# Patient Record
Sex: FEMALE | Race: WHITE | Employment: FULL TIME | URBAN - NONMETROPOLITAN AREA
[De-identification: names, ages, dates, MRNs, and addresses within clinical notes are randomized per-mention and may not be internally consistent; named-entity substitution may affect disease eponyms.]

---

## 2023-02-12 ENCOUNTER — APPOINTMENT (OUTPATIENT)
Dept: CT IMAGING | Age: 58
End: 2023-02-12
Payer: COMMERCIAL

## 2023-02-12 ENCOUNTER — HOSPITAL ENCOUNTER (EMERGENCY)
Age: 58
Discharge: HOME | End: 2023-02-12
Attending: EMERGENCY MEDICINE
Payer: COMMERCIAL

## 2023-02-12 ENCOUNTER — APPOINTMENT (OUTPATIENT)
Dept: GENERAL RADIOLOGY | Age: 58
End: 2023-02-12
Payer: COMMERCIAL

## 2023-02-12 VITALS
TEMPERATURE: 98.3 F | HEART RATE: 75 BPM | DIASTOLIC BLOOD PRESSURE: 74 MMHG | RESPIRATION RATE: 25 BRPM | SYSTOLIC BLOOD PRESSURE: 124 MMHG | OXYGEN SATURATION: 94 %

## 2023-02-12 DIAGNOSIS — R91.8 PULMONARY NODULES: ICD-10-CM

## 2023-02-12 DIAGNOSIS — J44.9 CHRONIC OBSTRUCTIVE PULMONARY DISEASE, UNSPECIFIED COPD TYPE (HCC): ICD-10-CM

## 2023-02-12 DIAGNOSIS — J32.1 CHRONIC FRONTAL SINUSITIS: ICD-10-CM

## 2023-02-12 DIAGNOSIS — F41.1 ANXIETY STATE: Primary | ICD-10-CM

## 2023-02-12 LAB
ALBUMIN SERPL BCG-MCNC: 4.3 G/DL (ref 3.5–5.1)
ALP SERPL-CCNC: 118 U/L (ref 38–126)
ALT SERPL W/O P-5'-P-CCNC: 9 U/L (ref 11–66)
ANION GAP SERPL CALC-SCNC: 8 MEQ/L (ref 8–16)
AST SERPL-CCNC: 14 U/L (ref 5–40)
BASOPHILS ABSOLUTE: 0 THOU/MM3 (ref 0–0.1)
BASOPHILS NFR BLD AUTO: 0.5 %
BILIRUB CONJ SERPL-MCNC: < 0.2 MG/DL (ref 0–0.3)
BILIRUB SERPL-MCNC: 0.7 MG/DL (ref 0.3–1.2)
BUN SERPL-MCNC: 11 MG/DL (ref 7–22)
CALCIUM SERPL-MCNC: 10.8 MG/DL (ref 8.5–10.5)
CHLORIDE SERPL-SCNC: 106 MEQ/L (ref 98–111)
CO2 SERPL-SCNC: 27 MEQ/L (ref 23–33)
CREAT SERPL-MCNC: 0.5 MG/DL (ref 0.4–1.2)
DEPRECATED RDW RBC AUTO: 42.5 FL (ref 35–45)
EOSINOPHIL NFR BLD AUTO: 1.2 %
EOSINOPHILS ABSOLUTE: 0.1 THOU/MM3 (ref 0–0.4)
ERYTHROCYTE [DISTWIDTH] IN BLOOD BY AUTOMATED COUNT: 13.1 % (ref 11.5–14.5)
FLUAV RNA RESP QL NAA+PROBE: NOT DETECTED
FLUBV RNA RESP QL NAA+PROBE: NOT DETECTED
GFR SERPL CREATININE-BSD FRML MDRD: > 60 ML/MIN/1.73M2
GLUCOSE SERPL-MCNC: 125 MG/DL (ref 70–108)
HCT VFR BLD AUTO: 43.9 % (ref 37–47)
HGB BLD-MCNC: 14.7 GM/DL (ref 12–16)
IMM GRANULOCYTES # BLD AUTO: 0.02 THOU/MM3 (ref 0–0.07)
IMM GRANULOCYTES NFR BLD AUTO: 0.2 %
LIPASE SERPL-CCNC: 26.8 U/L (ref 5.6–51.3)
LYMPHOCYTES ABSOLUTE: 1.8 THOU/MM3 (ref 1–4.8)
LYMPHOCYTES NFR BLD AUTO: 20.5 %
MAGNESIUM SERPL-MCNC: 2.1 MG/DL (ref 1.6–2.4)
MCH RBC QN AUTO: 29.7 PG (ref 26–33)
MCHC RBC AUTO-ENTMCNC: 33.5 GM/DL (ref 32.2–35.5)
MCV RBC AUTO: 88.7 FL (ref 81–99)
MONOCYTES ABSOLUTE: 0.7 THOU/MM3 (ref 0.4–1.3)
MONOCYTES NFR BLD AUTO: 8.1 %
NEUTROPHILS NFR BLD AUTO: 69.5 %
NRBC BLD AUTO-RTO: 0 /100 WBC
NT-PROBNP SERPL IA-MCNC: 126.9 PG/ML (ref 0–124)
OSMOLALITY SERPL CALC.SUM OF ELEC: 282.1 MOSMOL/KG (ref 275–300)
PLATELET # BLD AUTO: 315 THOU/MM3 (ref 130–400)
PMV BLD AUTO: 10.1 FL (ref 9.4–12.4)
POTASSIUM SERPL-SCNC: 4.3 MEQ/L (ref 3.5–5.2)
PROT SERPL-MCNC: 6.5 G/DL (ref 6.1–8)
RBC # BLD AUTO: 4.95 MILL/MM3 (ref 4.2–5.4)
SARS-COV-2 RNA RESP QL NAA+PROBE: NOT DETECTED
SEGMENTED NEUTROPHILS ABSOLUTE COUNT: 6 THOU/MM3 (ref 1.8–7.7)
SODIUM SERPL-SCNC: 141 MEQ/L (ref 135–145)
TROPONIN T: < 0.01 NG/ML
TROPONIN T: < 0.01 NG/ML
WBC # BLD AUTO: 8.6 THOU/MM3 (ref 4.8–10.8)

## 2023-02-12 PROCEDURE — 87636 SARSCOV2 & INF A&B AMP PRB: CPT

## 2023-02-12 PROCEDURE — 83880 ASSAY OF NATRIURETIC PEPTIDE: CPT

## 2023-02-12 PROCEDURE — 6360000002 HC RX W HCPCS: Performed by: EMERGENCY MEDICINE

## 2023-02-12 PROCEDURE — 96374 THER/PROPH/DIAG INJ IV PUSH: CPT

## 2023-02-12 PROCEDURE — 83690 ASSAY OF LIPASE: CPT

## 2023-02-12 PROCEDURE — 84484 ASSAY OF TROPONIN QUANT: CPT

## 2023-02-12 PROCEDURE — 85025 COMPLETE CBC W/AUTO DIFF WBC: CPT

## 2023-02-12 PROCEDURE — 36415 COLL VENOUS BLD VENIPUNCTURE: CPT

## 2023-02-12 PROCEDURE — 71045 X-RAY EXAM CHEST 1 VIEW: CPT

## 2023-02-12 PROCEDURE — 6360000004 HC RX CONTRAST MEDICATION: Performed by: EMERGENCY MEDICINE

## 2023-02-12 PROCEDURE — 71275 CT ANGIOGRAPHY CHEST: CPT

## 2023-02-12 PROCEDURE — 93005 ELECTROCARDIOGRAM TRACING: CPT | Performed by: EMERGENCY MEDICINE

## 2023-02-12 PROCEDURE — 80053 COMPREHEN METABOLIC PANEL: CPT

## 2023-02-12 PROCEDURE — 99285 EMERGENCY DEPT VISIT HI MDM: CPT | Performed by: EMERGENCY MEDICINE

## 2023-02-12 PROCEDURE — 83735 ASSAY OF MAGNESIUM: CPT

## 2023-02-12 PROCEDURE — 82248 BILIRUBIN DIRECT: CPT

## 2023-02-12 PROCEDURE — 94640 AIRWAY INHALATION TREATMENT: CPT

## 2023-02-12 RX ORDER — ALBUTEROL SULFATE 90 UG/1
2 AEROSOL, METERED RESPIRATORY (INHALATION) 4 TIMES DAILY PRN
Qty: 18 G | Refills: 0 | Status: SHIPPED | OUTPATIENT
Start: 2023-02-12

## 2023-02-12 RX ORDER — ALPRAZOLAM 0.25 MG/1
0.25 TABLET ORAL NIGHTLY PRN
Qty: 7 TABLET | Refills: 0 | Status: SHIPPED | OUTPATIENT
Start: 2023-02-12 | End: 2023-02-19

## 2023-02-12 RX ORDER — FLUTICASONE PROPIONATE 50 MCG
2 SPRAY, SUSPENSION (ML) NASAL DAILY
Qty: 16 G | Refills: 0 | Status: SHIPPED | OUTPATIENT
Start: 2023-02-12

## 2023-02-12 RX ORDER — LORAZEPAM 2 MG/ML
1 INJECTION INTRAMUSCULAR ONCE
Status: COMPLETED | OUTPATIENT
Start: 2023-02-12 | End: 2023-02-12

## 2023-02-12 RX ORDER — ALBUTEROL SULFATE 2.5 MG/3ML
5 SOLUTION RESPIRATORY (INHALATION) ONCE
Status: COMPLETED | OUTPATIENT
Start: 2023-02-12 | End: 2023-02-12

## 2023-02-12 RX ORDER — CETIRIZINE HYDROCHLORIDE 10 MG/1
10 TABLET ORAL DAILY
Qty: 30 TABLET | Refills: 0 | Status: SHIPPED | OUTPATIENT
Start: 2023-02-12 | End: 2023-03-14

## 2023-02-12 RX ADMIN — IPRATROPIUM BROMIDE 0.5 MG: 0.5 SOLUTION RESPIRATORY (INHALATION) at 13:00

## 2023-02-12 RX ADMIN — ALBUTEROL SULFATE 5 MG: 2.5 SOLUTION RESPIRATORY (INHALATION) at 13:00

## 2023-02-12 RX ADMIN — IOPAMIDOL 80 ML: 755 INJECTION, SOLUTION INTRAVENOUS at 14:51

## 2023-02-12 RX ADMIN — LORAZEPAM 1 MG: 2 INJECTION INTRAMUSCULAR; INTRAVENOUS at 13:10

## 2023-02-12 NOTE — ED PROVIDER NOTES
[facility] [address] [address] EMERGENCY DEPT      EMERGENCY MEDICINE     Pt Name: Delmis Yu  MRN: [id number]  [place] 1965  Date of evaluation: 2/12/2023  Resident Physician: Eldon Anderson DPM  Supervising Physician: Ramya Yuan DO    CHIEF COMPLAINT       Chief Complaint   Patient presents with    Insomnia     HISTORY OF PRESENT ILLNESS   Delmis Yu is a pleasant 59 y.o. female who presents to the emergency department from from home, as a walk in to the ED lobby for evaluation of insomnia and shortness of breath. Patient states that for the past week she has attempted to lay down to sleep and her mind will not stop racing and she feels short of breath, describing a sensation of tightness and squeezing around her neck. She relates that her sleeping habits are not the best, and she has to continually rock her head back-and-forth while attempting to sleep and has done this since childhood. She relates that she did visit her mother recently and contracted COVID approximately 3 weeks ago and was quite upset and anxious as the facility her mother was then went under lockdown protocol. She does not relate any feelings of anxiety when trying to sleep since before this time. She does admit to a cough that has persisted for the past year or so, with intermittently bringing up phlegm. She relates that she only feels short of breath during the times when trying to sleep. She relates that she has not seen a physician in approximately 30 years. Traits that she has smoked since she was 13, most recently 1 pack/day. On exam patient was tearful and anxious, however not in any acute distress. During exam she did not endorse any shortness of breath, chest pain or cough. PASTMEDICAL HISTORY   No past medical history on file. There is no problem list on file for this patient. SURGICAL HISTORY     No past surgical history on file.     CURRENT MEDICATIONS       Discharge Medication List as of 2/12/2023  3:27 PM          ALLERGIES     has no allergies on file. FAMILY HISTORY     She indicated that her mother is alive. She indicated that her father is . She indicated that both of her sisters are alive. She indicated that her brother is alive. SOCIAL HISTORY       Social History     Tobacco Use    Smoking status: Every Day     Packs/day: 1.00     Years: 40.00     Pack years: 40.00     Types: Cigarettes   Substance Use Topics    Drug use: Never       PHYSICAL EXAM       ED Triage Vitals   BP Temp Temp src Pulse Resp SpO2 Height Weight   -- -- -- -- -- -- -- --       Additional Vital Signs:  Vitals:    23 1436   BP: 124/74   Pulse: 75   Resp: 25   Temp:    SpO2: 94%     Physical Exam  Vitals and nursing note reviewed. Constitutional:       General: She is not in acute distress. Appearance: She is not ill-appearing. HENT:      Head: Normocephalic and atraumatic. Right Ear: Tympanic membrane normal.      Left Ear: Tympanic membrane normal.      Nose: Congestion and rhinorrhea present. Mouth/Throat:      Mouth: Mucous membranes are moist.      Pharynx: Oropharynx is clear. Eyes:      General: Lids are normal. Gaze aligned appropriately. Extraocular Movements: Extraocular movements intact. Conjunctiva/sclera: Conjunctivae normal.      Pupils: Pupils are equal, round, and reactive to light. Cardiovascular:      Rate and Rhythm: Regular rhythm. Tachycardia present. Pulses: Normal pulses. Heart sounds: Normal heart sounds. Pulmonary:      Breath sounds: Examination of the right-upper field reveals decreased breath sounds. Examination of the left-upper field reveals decreased breath sounds. Examination of the right-middle field reveals decreased breath sounds. Examination of the left-middle field reveals decreased breath sounds. Examination of the right-lower field reveals decreased breath sounds. Examination of the left-lower field reveals decreased breath sounds. Decreased breath sounds present.   Abdominal:      General: Abdomen is flat. Bowel sounds are normal.      Palpations: Abdomen is soft.      Tenderness: There is no abdominal tenderness.   Musculoskeletal:      Cervical back: Normal range of motion. No rigidity or tenderness.   Skin:     General: Skin is warm and dry.      Capillary Refill: Capillary refill takes 2 to 3 seconds.   Neurological:      General: No focal deficit present.      Mental Status: She is alert and oriented to person, place, and time.      Cranial Nerves: Cranial nerves 2-12 are intact.      Sensory: Sensation is intact.      Motor: Motor function is intact.      Coordination: Coordination is intact.   Psychiatric:         Attention and Perception: Attention normal.         Mood and Affect: Mood is anxious. Affect is labile.         Speech: Speech normal.         Behavior: Behavior is cooperative.         Thought Content: Thought content normal.         Cognition and Memory: Cognition normal.       FORMAL DIAGNOSTIC RESULTS     RADIOLOGY: Interpretation per the Radiologist below, if available at the time of this note (none if blank):    CTA CHEST W WO CONTRAST   Final Result   1. No pulmonary embolism.   2. Multiple right lung nodules of indeterminant etiology but suspicious for malignancy.   3. Chronic lung disease.      Final report electronically signed by Dr. Fran Elizondo on 2/12/2023 3:02 PM      XR CHEST PORTABLE   Final Result   1. Lungs are hyperinflated and fibroemphysematous in appearance. Normal heart size. No acute findings.   2. There is a 28 mm ovoid nodule that projects over right apex, possibly a granuloma or fibrotic scar. CT thorax would be helpful if further evaluation is desired.            **This report has been created using voice recognition software.  It may contain minor errors which are inherent in voice recognition technology.**      Final report electronically signed by Dr. Yash Wilcox on 2/12/2023 12:35 PM     LABS: (none if blank)  Labs Reviewed   BRAIN NATRIURETIC PEPTIDE - Abnormal; Notable for the following components:       Result Value    Pro-.9 (*)     All other components within normal limits   BASIC METABOLIC PANEL - Abnormal; Notable for the following components:    Glucose 125 (*)     Calcium 10.8 (*)     All other components within normal limits   HEPATIC FUNCTION PANEL - Abnormal; Notable for the following components:    ALT 9 (*)     All other components within normal limits   COVID-19 & INFLUENZA COMBO   CBC WITH AUTO DIFFERENTIAL   LIPASE   TROPONIN   MAGNESIUM   ANION GAP   OSMOLALITY   GLOMERULAR FILTRATION RATE, ESTIMATED   TROPONIN       (Any cultures that may have been sent were not resulted at the time of this patient visit)    [facility] [place] [facility] / ED COURSE:     1) Number and Complexity of Problems            Problem List This Visit:         Chief Complaint   Patient presents with    Insomnia            Differential Diagnosis includes (but not limited to):  COPD, asthma, pneumothorax, anaphylaxis, anxiety, PE , pericardial effusion, CHF, atelectasis, lower airway obstruction, aspiration        Diagnoses Considered but I have low suspicion of:    ACS/MI             Pertinent Comorbid Conditions:    No PMHx    2)  Data Reviewed (none if left blank)          My Independent interpretations:     EKG:      Nonspecific ST and T wave changes. Imaging: Chest x-ray: Hyperinflated lungs, small ovoid hyperdense area noted at right lung apex. Labs:      BMP, BNP, CBC, COVID flu combo, hepatic function, lipase, magnesium, troponin. proBNP elevated at 126.9. Mild elevated glucose of 125. COVID-19/influenza negative. All other labs negative for any acute findings. Decision Rules/Clinical Scores utilized:              External Documentation Reviewed:         Previous patient encounter documents & history available on EMR was reviewed.              See Formal Diagnostic Results above for the lab and radiology tests and orders. 3)  Treatment and Disposition         ED Reassessment: On reassessment patient was found to be very anxious. After thorough lab work-up this was felt to be the majority of her issues. Case discussed with consulting clinician:           Shared Decision-Making was performed and disposition discussed with the        Patient/Family and questions answered          Social determinants of health impacting treatment or disposition:  patient is a chronic cigarette smoker         Code Status:  Not on file      Summary of Patient Presentation:    Medical Decision Making  This is a 78-year-old female presented to the emergency department complaints of shortness of breath and tightness in her throat while trying to sleep. Examination revealed a severely anxious patient, laboratory nonconcerning for any ACS, CTA chest however concerning for pulmonary nodules. She also presented with symptoms of chronic sinusitis. Patient was given prescription for Zyrtec, Flonase, albuterol, Xanax 0.25 x 7 days, with instructions on prompt follow-up with  for continued management and care. /  ED Course as of 02/12/23 1545   Sun Feb 12, 2023   1228 Ordering lab work up, cardiac included per EKG changes. Concern initially for CHF and COPD as she is a chronic smoker. Ordered one time breathing treatment and albuterol/ipratropium. [AA]   1250 Chest x-ray returned:    Impression  1. Lungs are hyperinflated and fibroemphysematous in appearance. Normal heart size. No acute findings. 2. There is a 28 mm ovoid nodule that projects over right apex, possibly a granuloma or fibrotic scar. CT thorax would be helpful if further evaluation is desired. **This report has been created using voice recognition software. It may contain minor errors which are inherent in voice recognition technology. **     Final report electronically signed by Dr. Dorian Lima on 2/12/2023 12:35 PM     [AA]   1301 After reevaluation, it was felt that anxiety is playing a major role in patient's symptoms of being unable to sleep and throat tightness. Dose of Ativan given in ED and will reassess patient. [AA]   1316 Due to nonspecific ST and T wave changes on EKG, will repeat troponin. If patient does well with dose of Ativan in the ED, will give prescription for 1 mg p.o. nightly for 5 days, with recommended follow-up with Dr. Donna Tai within 2-3 days. [AA]   1509 Repeat troponin <0.010 [AA]   5625 CTA chest obtained due to nodule seen on x-ray. Returned with a 1.6 cm nodule in the right upper lung with a small peripheral calcification. Additional smaller nodules are located in the right lung. Largest is right upper lobe measures 0.4 cm. No mediastinal, hilar, or axillary lymphadenopathy. These have indeterminate etiology, but they are suspicious for malignancy. [AA]   823 8218 Had discussion with patient and significant other present concerning results of CTA chest.  Described nonspecific lung nodule findings and while this is not definitive for any cancer or malignant finding, it is concerning and that patient should follow-up with  as soon as possible for continued care and management with follow-up and serial examinations. Due to findings on chest x-ray and CTA chest, it was felt the patient more than likely has COPD. It was also felt that patient has anxiety as she was able to sleep and appeared less anxious in cot after giving a dose of Ativan. Physical exam findings with rhinorrhea and complaint of coughing and itching were suggestive of chronic sinusitis. Patient had pulmonary nodules on CTA chest.  Therefore, patient was given prescription for Zyrtec, Flonase, albuterol, Xanax 0.25 nightly 7 pills.     It was discussed at length with patient and significant other present that these are to help control symptoms associated with COPD and chronic sinusitis, and to help her rest and get sleep. Patient was stable for discharge, with recommendation of follow-up with  as soon as possible for continued management and care. [AA]      ED Course User Index  [AA] Silke Villanueva DPM     Vitals Reviewed:    Vitals:    02/12/23 1158 02/12/23 1300 02/12/23 1313 02/12/23 1436   BP: (!) 169/105  (!) 169/90 124/74   Pulse: 84 73 74 75   Resp: 23 16 21 25   Temp: 98.3 °F (36.8 °C)      TempSrc: Oral      SpO2: 99% 98% 100% 94%       The patient was seen and examined. Appropriate diagnostic testing was performed and results reviewed with the patient. The results of pertinent diagnostic studies and exam findings were discussed. The patients provisional diagnosis and plan of care were discussed with the patient and present family who expressed understanding. Any medications were reviewed and indications and risks of medications were discussed with the patient /family present. Strict verbal and written return precautions, instructions and appropriate follow-up provided to  the patient.      ED Medications administered this visit:  (None if blank)  Medications   ipratropium (ATROVENT) 0.02 % nebulizer solution 0.5 mg (0.5 mg Nebulization Given 2/12/23 1300)   ipratropium (ATROVENT) 0.02 % nebulizer solution 0.5 mg (0.5 mg Nebulization Given 2/12/23 1300)   albuterol (PROVENTIL) nebulizer solution 5 mg (5 mg Nebulization Given 2/12/23 1300)   LORazepam (ATIVAN) injection 1 mg (1 mg IntraVENous Given 2/12/23 1310)   iopamidol (ISOVUE-370) 76 % injection 80 mL (80 mLs IntraVENous Given 2/12/23 1451)         PROCEDURES: (None if blank)  Procedures:     CRITICAL CARE: (None if blank)      DISCHARGE PRESCRIPTIONS: (None if blank)  Discharge Medication List as of 2/12/2023  3:27 PM        START taking these medications    Details   cetirizine (ZYRTEC) 10 MG tablet Take 1 tablet by mouth daily, Disp-30 tablet, R-0Normal      fluticasone (FLONASE) 50 MCG/ACT nasal spray 2 sprays by Each Nostril route daily, Disp-16 g, R-0Normal      ALPRAZolam (XANAX) 0.25 MG tablet Take 1 tablet by mouth nightly as needed for Sleep for up to 7 doses. Max Daily Amount: 0.25 mg, Disp-7 tablet, R-0Normal      albuterol sulfate HFA (VENTOLIN HFA) 108 (90 Base) MCG/ACT inhaler Inhale 2 puffs into the lungs 4 times daily as needed for Wheezing, Disp-18 g, R-0Normal             FINAL IMPRESSION      1. Anxiety state    2. Chronic obstructive pulmonary disease, unspecified COPD type ([facility] [address]    3. Chronic frontal sinusitis    4.  Pulmonary nodules          DISPOSITION/PLAN   DISPOSITION Decision To Discharge 02/12/2023 03:19:50 PM      OUTPATIENT FOLLOW UP THE PATIENT:  MD NUNO [address]  [phone]    In 2 days      [facility] [address] [address] EMERGENCY DEPT  [address]  Go to   If symptoms worsen    PATRICIA Plummer DPM  Resident  02/12/23 4213

## 2023-02-12 NOTE — ED NOTES
Patient medicated per MAR at this time. Patient also provided an update on the plan of care at this time. Patient denies further questions. Patient VSS. Respirs are easy and no acute distress noted. Call light jillian osborn. Will continue to monitor.        Celso Pro RN  02/12/23 6349

## 2023-02-12 NOTE — DISCHARGE INSTRUCTIONS
You were seen in the emergency department for shortness of breath and throat tightness. Prescriptions for Zyrtec, Flonase, albuterol, Xanax were sent to [facility] on [address]. Take prescriptions as prescribed. Contact 's office as soon as possible to be seen for follow-up care and further management. If symptoms persist or worsen, please return to the emergency department.

## 2023-02-12 NOTE — ED NOTES
Pt to the ED via self with family. Patient presents with complaints of insomnia and SOB. Patient states that she hasnt been seen by a doctor in over 30 years. EKG done. Patient is alert and oriented x 4. Respirations are regular and unlabored. Patient provided blanket. Family at the bedside and call light within reach.      Dipti Garcia RN  02/12/23 1200

## 2023-02-12 NOTE — ED PROVIDER NOTES
I performed a history and physical examination of the patient and discussed management with the resident. I reviewed the residents note and agree with the documented findings and plan of care. Any areas of disagreement are noted on the chart. I was personally present for the key portions of any procedures. I have documented in the chart those procedures where I was not present during the key portions. I have reviewed the emergency nurses triage note. I agree with the chief complaint, past medical history, past surgical history, allergies, medications, social and family history as documented unless otherwise noted below. Documentation of the HPI, Physical Exam and Medical Decision Making performed by medical students or scribes is based on my personal performance of the HPI, PE and MDM. For Phys Assistant/ Nurse Practitioner cases/documentation I have personally evaluated this patient and have completed at least one if not all key elements of the E/M (history, physical exam, and MDM). My findings are as noted below. In other words, I personally saw and examined the patient I have reviewed and agreed with the resident findings including all diagnostic interpretations and treatment plans as written. I was present for the key portion of any procedures performed and the inclusive time noted in any critical care statement. Patient comes in today complaining of shortness of breath. Patient has no real active diagnoses. She has not seen a doctor in 30 years not on any medication. Patient is stating that she has trouble when she lies down or feels like she cannot breathe. In further discussion with the patient and according to the  a lot of this revolves around their current psychosocial situation. The  is currently out of work after having back surgery. Feels are coming in, the patient does have a history of anxiety she is not taking anything for it and she has been more more anxious.   Also the patient's sleep hygiene is very poor. She goes to bed at 1030 and wakes up at 330 every day. She does come home and nap during the day. Patient has been consistent smoker for all this time. Her sister does have COPD. Here today physical exam reveals distant lung sounds bilaterally in a very cachectic woman however there is mild expiratory wheezing. Lower extremities are not full of fluid. No significant murmurs are appreciated. During the course of our discussion she did cry several times I think that anxiety is the biggest situation. EKG reveals normal sinus rhythm, normal axis, ventricular rate of 73 LA interval 128 QRS duration 78 QT interval 360 QTc of 396. Minor ST changes in leads II, 3, aVF with changes also noted in V1 V2 and V3. No old EKGs to compare to. CTA CHEST W WO CONTRAST   Final Result   1. No pulmonary embolism. 2. Multiple right lung nodules of indeterminant etiology but suspicious for malignancy. 3. Chronic lung disease. Final report electronically signed by Dr. Warren Vila on 2/12/2023 3:02 PM      XR CHEST PORTABLE   Final Result   1. Lungs are hyperinflated and fibroemphysematous in appearance. Normal heart size. No acute findings. 2. There is a 28 mm ovoid nodule that projects over right apex, possibly a granuloma or fibrotic scar. CT thorax would be helpful if further evaluation is desired. **This report has been created using voice recognition software. It may contain minor errors which are inherent in voice recognition technology. **      Final report electronically signed by Dr. Shaila Alvarez on 2/12/2023 12:35 PM        Labs Reviewed   [address] - Abnormal; Notable for the following components:       Result Value    Pro-.9 (*)     All other components within normal limits   BASIC METABOLIC PANEL - Abnormal; Notable for the following components:    Glucose 125 (*)     Calcium 10.8 (*)     All other components within normal limits   HEPATIC FUNCTION PANEL - Abnormal; Notable for the following components:    ALT 9 (*)     All other components within normal limits   COVID-19 & INFLUENZA COMBO   CBC WITH AUTO DIFFERENTIAL   LIPASE   TROPONIN   MAGNESIUM   ANION GAP   OSMOLALITY   GLOMERULAR FILTRATION RATE, ESTIMATED   TROPONIN         Final diagnoses:   Anxiety state   Chronic obstructive pulmonary disease, unspecified COPD type (HCC)   Chronic frontal sinusitis   Pulmonary nodules   . I have seen this patient with the resident Dr. Neeraj Roberts and agree with his assessment and plan.      Ramya Yuan,   02/12/23 1530

## 2023-02-12 NOTE — ED NOTES
Patient in bed lying on side with eyes closed. Patient appears to be resting at this time. Patient respirations are regular and unlabored. Patient vital signs are stable and respirations are noted. Will continue to monitor patient and call light placed within patients reach.        Sary Rojas RN  02/12/23 3114

## 2023-02-13 LAB
EKG ATRIAL RATE: 73 BPM
EKG P AXIS: 81 DEGREES
EKG P-R INTERVAL: 128 MS
EKG Q-T INTERVAL: 360 MS
EKG QRS DURATION: 78 MS
EKG QTC CALCULATION (BAZETT): 396 MS
EKG R AXIS: 82 DEGREES
EKG T AXIS: 63 DEGREES
EKG VENTRICULAR RATE: 73 BPM

## 2023-02-13 PROCEDURE — 93010 ELECTROCARDIOGRAM REPORT: CPT | Performed by: INTERNAL MEDICINE

## 2023-02-28 ENCOUNTER — HOSPITAL ENCOUNTER (OUTPATIENT)
Dept: PULMONOLOGY | Age: 58
Discharge: HOME | End: 2023-02-28
Payer: COMMERCIAL

## 2023-02-28 DIAGNOSIS — F41.1 GENERALIZED ANXIETY DISORDER: ICD-10-CM

## 2023-02-28 DIAGNOSIS — I10 ESSENTIAL HYPERTENSION, MALIGNANT: ICD-10-CM

## 2023-02-28 DIAGNOSIS — R91.1 COIN LESION: ICD-10-CM

## 2023-02-28 DIAGNOSIS — I73.9 PERIPHERAL VASCULAR DISEASE, UNSPECIFIED (HCC): ICD-10-CM

## 2023-02-28 DIAGNOSIS — J44.9 OBSTRUCTIVE CHRONIC BRONCHITIS WITHOUT EXACERBATION (HCC): ICD-10-CM

## 2023-02-28 PROCEDURE — 94726 PLETHYSMOGRAPHY LUNG VOLUMES: CPT

## 2023-02-28 PROCEDURE — 94729 DIFFUSING CAPACITY: CPT

## 2023-02-28 PROCEDURE — 94060 EVALUATION OF WHEEZING: CPT

## 2023-02-28 NOTE — PROGRESS NOTES
Prescreening performed prior to testing.  The following symptoms may indicate COVID-19 infection:        One of the following criteria:   Temperature taken, patient temperature was 97.9 F.   Fever greater 100.0 F -no  New onset cough -  no  New onset shortness of breath -no  New onset difficulty breathing -no        And/or   Two or more of the following criteria:  New onset muscle aches -no  New onset headache -no  New onset sore throat -no  New onset loss of smell/taste -no  New onset diarrhea -no    Patient's screening was negative. PFT will be performed.

## 2025-06-06 ENCOUNTER — LAB (OUTPATIENT)
Dept: LAB | Age: 60
End: 2025-06-06

## 2025-06-06 LAB
25(OH)D3 SERPL-MCNC: 86 NG/ML (ref 30–100)
ALBUMIN SERPL BCG-MCNC: 4.2 G/DL (ref 3.4–4.9)
ALP SERPL-CCNC: 106 U/L (ref 38–126)
ALT SERPL W/O P-5'-P-CCNC: 9 U/L (ref 10–35)
ANION GAP SERPL CALC-SCNC: 11 MEQ/L (ref 8–16)
AST SERPL-CCNC: 18 U/L (ref 10–35)
BASOPHILS ABSOLUTE: 0 THOU/MM3 (ref 0–0.1)
BASOPHILS NFR BLD AUTO: 0.7 %
BILIRUB SERPL-MCNC: 0.8 MG/DL (ref 0.3–1.2)
BUN SERPL-MCNC: 16 MG/DL (ref 8–23)
CALCIUM SERPL-MCNC: 10.8 MG/DL (ref 8.8–10.2)
CHLORIDE SERPL-SCNC: 106 MEQ/L (ref 98–111)
CHOLEST SERPL-MCNC: 206 MG/DL (ref 100–199)
CO2 SERPL-SCNC: 26 MEQ/L (ref 22–29)
CREAT SERPL-MCNC: 0.7 MG/DL (ref 0.5–0.9)
CREAT UR-MCNC: 194 MG/DL
DEPRECATED MEAN GLUCOSE BLD GHB EST-ACNC: 111 MG/DL (ref 70–126)
DEPRECATED RDW RBC AUTO: 42.2 FL (ref 35–45)
EOSINOPHIL NFR BLD AUTO: 2.4 %
EOSINOPHILS ABSOLUTE: 0.2 THOU/MM3 (ref 0–0.4)
ERYTHROCYTE [DISTWIDTH] IN BLOOD BY AUTOMATED COUNT: 13 % (ref 11.5–14.5)
FOLATE SERPL-MCNC: 19.7 NG/ML (ref 4.6–34.8)
GFR SERPL CREATININE-BSD FRML MDRD: > 90 ML/MIN/1.73M2
GLUCOSE SERPL-MCNC: 83 MG/DL (ref 74–109)
HBA1C MFR BLD HPLC: 5.7 % (ref 4–6)
HCT VFR BLD AUTO: 44.2 % (ref 37–47)
HDLC SERPL-MCNC: 65 MG/DL
HGB BLD-MCNC: 14.4 GM/DL (ref 12–16)
IMM GRANULOCYTES # BLD AUTO: 0.01 THOU/MM3 (ref 0–0.07)
IMM GRANULOCYTES NFR BLD AUTO: 0.1 %
LDLC SERPL CALC-MCNC: 123 MG/DL
LYMPHOCYTES ABSOLUTE: 2 THOU/MM3 (ref 1–4.8)
LYMPHOCYTES NFR BLD AUTO: 29.5 %
MCH RBC QN AUTO: 29 PG (ref 26–33)
MCHC RBC AUTO-ENTMCNC: 32.6 GM/DL (ref 32.2–35.5)
MCV RBC AUTO: 88.9 FL (ref 81–99)
MICROALBUMIN UR-MCNC: 3.3 MG/DL
MICROALBUMIN/CREAT RATIO PNL UR: 17 MG/G (ref 0–30)
MONOCYTES ABSOLUTE: 0.6 THOU/MM3 (ref 0.4–1.3)
MONOCYTES NFR BLD AUTO: 9.3 %
NEUTROPHILS ABSOLUTE: 3.9 THOU/MM3 (ref 1.8–7.7)
NEUTROPHILS NFR BLD AUTO: 58 %
NRBC BLD AUTO-RTO: 0 /100 WBC
PLATELET # BLD AUTO: 348 THOU/MM3 (ref 130–400)
PMV BLD AUTO: 11.2 FL (ref 9.4–12.4)
POTASSIUM SERPL-SCNC: 3.8 MEQ/L (ref 3.5–5.2)
PROT SERPL-MCNC: 6.8 G/DL (ref 6.4–8.3)
RBC # BLD AUTO: 4.97 MILL/MM3 (ref 4.2–5.4)
SODIUM SERPL-SCNC: 143 MEQ/L (ref 135–145)
TRIGL SERPL-MCNC: 89 MG/DL (ref 0–199)
TSH SERPL DL<=0.05 MIU/L-ACNC: 0.74 UIU/ML (ref 0.27–4.2)
VIT B12 SERPL-MCNC: 573 PG/ML (ref 232–1245)
WBC # BLD AUTO: 6.8 THOU/MM3 (ref 4.8–10.8)